# Patient Record
(demographics unavailable — no encounter records)

---

## 2025-06-14 NOTE — IMAGING
[de-identified] : LEFT ankle exam: Wilde Test: negative Homans Sign: negative TTP: anterior thom / pain with digit extension against resistance Anterior Drawer Test: negative Talar Tilt Test: negative Circumduction: painful Strength Testin/5 all planes Gait examination: mildly antalgic to the left    Left foot: TTP: none Forefoot compression: nttp EHL / FHL strength: 5/5 2+ DP and PT pulses are noted. All digits are nvi with FAROM.  Left foot 3 view xray showed mild hindfoot OA / no acute bony pathology

## 2025-06-14 NOTE — ASSESSMENT
[FreeTextEntry1] : The patient was advised of the diagnosis. The natural history of the pathology was explained in full to the patient in layman's terms. All questions were answered. The risks and benefits of surgical and non-surgical treatment alternatives were explained in full to the patient.   Pt provided left short leg splint for comfort Pt will CAM (tall) on [employer] for ambulation Medrol dose pack provided for discomfort. Pt will rto in 1 week for f/u care.

## 2025-06-14 NOTE — HISTORY OF PRESENT ILLNESS
[de-identified] : 06/14/25: Pt is a 34 y/o female presenting of left foot pain. Pt states pain started April 2025, progressively getting worst. Went to an orthopedist, diagnose her with tendonitis 2024. Pain is along the top of the foot and anterior ankle. Ice/stretch/heat to affected area. Ibuprofen/Tylenol for pain. Wearing a compression sock.   PMH: denied Allergies: NKDA  [FreeTextEntry1] : left foot

## 2025-06-19 NOTE — HISTORY OF PRESENT ILLNESS
[Dull/Aching] : dull/aching [Sharp] : sharp [de-identified] : 06/19/2025: 1 week foot pain assoc w/ working in garden. went to oc [facility] walking in boot. had similar issue last year. took mdp w/ some relief. no prior foot surgery. 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

## 2025-06-19 NOTE — PHYSICAL EXAM
[Left] : left foot and ankle [NL (40)] : plantar flexion 40 degrees [NL 30)] : inversion 30 degrees [NL (20)] : eversion 20 degrees [5___] : [facility] 5[unfilled]/5 [2+] : dorsalis pedis pulse: 2+ [Normal] : saphenous nerve sensation normal [] : patient ambulates without assistive device